# Patient Record
Sex: FEMALE | URBAN - METROPOLITAN AREA
[De-identification: names, ages, dates, MRNs, and addresses within clinical notes are randomized per-mention and may not be internally consistent; named-entity substitution may affect disease eponyms.]

---

## 2019-10-25 ENCOUNTER — APPOINTMENT (RX ONLY)
Dept: URBAN - METROPOLITAN AREA CLINIC 132 | Facility: CLINIC | Age: 44
Setting detail: DERMATOLOGY
End: 2019-10-25

## 2019-11-08 ENCOUNTER — RX ONLY (OUTPATIENT)
Age: 44
Setting detail: RX ONLY
End: 2019-11-08

## 2019-11-08 RX ORDER — METHYLPREDNISOLONE 4 MG/1
TABLET ORAL
Qty: 1 | Refills: 0 | Status: ERX | COMMUNITY
Start: 2019-11-08

## 2021-02-15 ENCOUNTER — RX ONLY (OUTPATIENT)
Age: 46
Setting detail: RX ONLY
End: 2021-02-15

## 2021-02-15 RX ORDER — CRISABOROLE 20 MG/G
OINTMENT TOPICAL
Qty: 1 | Refills: 2 | Status: ERX | COMMUNITY
Start: 2021-02-15

## 2021-05-25 ENCOUNTER — APPOINTMENT (RX ONLY)
Dept: URBAN - METROPOLITAN AREA CLINIC 90 | Facility: CLINIC | Age: 46
Setting detail: DERMATOLOGY
End: 2021-05-25

## 2021-05-25 DIAGNOSIS — L81.4 OTHER MELANIN HYPERPIGMENTATION: ICD-10-CM

## 2021-05-25 DIAGNOSIS — D22 MELANOCYTIC NEVI: ICD-10-CM

## 2021-05-25 DIAGNOSIS — L20.89 OTHER ATOPIC DERMATITIS: ICD-10-CM

## 2021-05-25 PROBLEM — D22.9 MELANOCYTIC NEVI, UNSPECIFIED: Status: ACTIVE | Noted: 2021-05-25

## 2021-05-25 PROBLEM — D48.5 NEOPLASM OF UNCERTAIN BEHAVIOR OF SKIN: Status: ACTIVE | Noted: 2021-05-25

## 2021-05-25 PROCEDURE — ? PRESCRIPTION

## 2021-05-25 PROCEDURE — 11102 TANGNTL BX SKIN SINGLE LES: CPT

## 2021-05-25 PROCEDURE — ? PRESCRIPTION MEDICATION MANAGEMENT

## 2021-05-25 PROCEDURE — ? TREATMENT REGIMEN

## 2021-05-25 PROCEDURE — ? FULL BODY SKIN EXAM

## 2021-05-25 PROCEDURE — ? BIOPSY BY SHAVE METHOD

## 2021-05-25 PROCEDURE — 99213 OFFICE O/P EST LOW 20 MIN: CPT | Mod: 25

## 2021-05-25 PROCEDURE — ? COUNSELING

## 2021-05-25 PROCEDURE — ? DIAGNOSIS COMMENT

## 2021-05-25 RX ORDER — TRIFAROTENE 50 UG/G
1 CREAM TOPICAL QD
Qty: 1 | Refills: 3 | Status: ERX | COMMUNITY
Start: 2021-05-25

## 2021-05-25 RX ORDER — CRISABOROLE 20 MG/G
1 OINTMENT TOPICAL BID
Qty: 1 | Refills: 6 | Status: ERX

## 2021-05-25 RX ADMIN — TRIFAROTENE 1: 50 CREAM TOPICAL at 00:00

## 2021-05-25 ASSESSMENT — LOCATION DETAILED DESCRIPTION DERM
LOCATION DETAILED: LEFT SUPERIOR LATERAL MALAR CHEEK
LOCATION DETAILED: LEFT VENTRAL PROXIMAL FOREARM
LOCATION DETAILED: UPPER STERNUM
LOCATION DETAILED: RIGHT ANTECUBITAL SKIN
LOCATION DETAILED: RIGHT SUPERIOR LATERAL MALAR CHEEK
LOCATION DETAILED: LEFT ANTECUBITAL SKIN
LOCATION DETAILED: LEFT POSTERIOR SHOULDER

## 2021-05-25 ASSESSMENT — LOCATION SIMPLE DESCRIPTION DERM
LOCATION SIMPLE: RIGHT CHEEK
LOCATION SIMPLE: CHEST
LOCATION SIMPLE: LEFT FOREARM
LOCATION SIMPLE: LEFT CHEEK
LOCATION SIMPLE: LEFT SHOULDER
LOCATION SIMPLE: RIGHT UPPER ARM
LOCATION SIMPLE: LEFT UPPER ARM

## 2021-05-25 ASSESSMENT — LOCATION ZONE DERM
LOCATION ZONE: ARM
LOCATION ZONE: ARM
LOCATION ZONE: TRUNK
LOCATION ZONE: FACE

## 2021-05-25 ASSESSMENT — SEVERITY ASSESSMENT 2020: SEVERITY 2020: CLEAR

## 2021-05-25 NOTE — PROCEDURE: BIOPSY BY SHAVE METHOD
Detail Level: Detailed
Depth Of Biopsy: dermis
Was A Bandage Applied: Yes
Size Of Lesion In Cm: 0.4
X Size Of Lesion In Cm: 0
Biopsy Type: H and E
Biopsy Method: Dermablade
Anesthesia Type: 1% lidocaine with epinephrine 1:100,000 buffered with 8.4% sodium bicarbonate (1:9 ratio)
Hemostasis: Aluminum Chloride
Wound Care: Mupirocin
Dressing: Band-Aid
Destruction After The Procedure: No
Type Of Destruction Used: Curettage
Curettage Text: The wound bed was treated with curettage after the biopsy was performed.
Cryotherapy Text: The wound bed was treated with cryotherapy after the biopsy was performed.
Electrodesiccation Text: The wound bed was treated with electrodesiccation after the biopsy was performed.
Electrodesiccation And Curettage Text: The wound bed was treated with electrodesiccation and curettage after the biopsy was performed.
Silver Nitrate Text: The wound bed was treated with silver nitrate after the biopsy was performed.
Lab: 6
Lab Facility: [id number]
Consent: Written consent was obtained and risks were reviewed including but not limited to scarring, infection, bleeding, scabbing, incomplete removal, nerve damage and allergy to anesthesia.
Post-Care Instructions: I reviewed with the patient in detail post-care instructions. Patient is to keep the biopsy site dry overnight, and then apply bactroban twice daily until healed. Patient may apply hydrogen peroxide soaks to remove any crusting.
Notification Instructions: Patient may RTC in 2 weeks if they would like to receive biopsy results in person.  Otherwise, patient is instructed to call the office if not contacted within 2  - 3 weeks.
Billing Type: Third-Party Bill
Information: Selecting Yes will display possible errors in your note based on the variables you have selected. This validation is only offered as a suggestion for you. PLEASE NOTE THAT THE VALIDATION TEXT WILL BE REMOVED WHEN YOU FINALIZE YOUR NOTE. IF YOU WANT TO FAX A PRELIMINARY NOTE YOU WILL NEED TO TOGGLE THIS TO 'NO' IF YOU DO NOT WANT IT IN YOUR FAXED NOTE.

## 2021-05-25 NOTE — PROCEDURE: DIAGNOSIS COMMENT
Render Risk Assessment In Note?: yes
Detail Level: Simple
Comment: Altagracia requests an RX for EUCRISA which controls her eczema.

## 2021-05-25 NOTE — PROCEDURE: PRESCRIPTION MEDICATION MANAGEMENT
Initiate Treatment: Aklief 0.005 % topical cream QHS
Render In Strict Bullet Format?: No
Detail Level: Zone
Samples Given: (3) samples of Aklief \\n(2) samples of Elta MD sunscreen (tinted and clear)
Continue Regimen: EUCRISA Ointment

## 2021-07-15 ENCOUNTER — RX ONLY (OUTPATIENT)
Age: 46
Setting detail: RX ONLY
End: 2021-07-15

## 2021-07-15 RX ORDER — METHYLPREDNISOLONE 4 MG/1
TABLET ORAL
Qty: 1 | Refills: 0 | Status: ERX | COMMUNITY
Start: 2021-07-14

## 2021-07-22 ENCOUNTER — APPOINTMENT (RX ONLY)
Dept: URBAN - METROPOLITAN AREA CLINIC 91 | Facility: CLINIC | Age: 46
Setting detail: DERMATOLOGY
End: 2021-07-22

## 2021-07-22 DIAGNOSIS — Z41.9 ENCOUNTER FOR PROCEDURE FOR PURPOSES OTHER THAN REMEDYING HEALTH STATE, UNSPECIFIED: ICD-10-CM

## 2021-07-22 PROCEDURE — ? BOTOX (U OR CC)

## 2021-07-22 ASSESSMENT — LOCATION DETAILED DESCRIPTION DERM
LOCATION DETAILED: RIGHT MEDIAL EYEBROW
LOCATION DETAILED: LEFT FOREHEAD
LOCATION DETAILED: GLABELLA
LOCATION DETAILED: LEFT MEDIAL EYEBROW
LOCATION DETAILED: GLABELLA
LOCATION DETAILED: RIGHT FOREHEAD

## 2021-07-22 ASSESSMENT — LOCATION SIMPLE DESCRIPTION DERM
LOCATION SIMPLE: GLABELLA
LOCATION SIMPLE: LEFT FOREHEAD
LOCATION SIMPLE: RIGHT FOREHEAD
LOCATION SIMPLE: RIGHT EYEBROW
LOCATION SIMPLE: LEFT EYEBROW
LOCATION SIMPLE: GLABELLA

## 2021-07-22 ASSESSMENT — LOCATION ZONE DERM
LOCATION ZONE: FACE
LOCATION ZONE: FACE

## 2021-07-22 NOTE — PROCEDURE: BOTOX (U OR CC)
Post-Care Instructions: Botox Cosmetic Post Treatment Instructions\\n1. Try to exercise your treated muscles for 1-2 hours after treatment (e.g. practice frowning, raising your eyebrows or squinting). This helps to work BOTOX Cosmetic into your muscles. Although this is thought to help, it will NOT impact your treatment negatively if you forget to do this.\\n2. Do NOT rub or massage the treated areas for 2 hours after your treatment. Do NOT do strenuous exercise for 4 hours after treatment. Also avoid facials or saunas for 4 hours after your treatment. This will minimize the risk of raising your blood pressure and therefore minimize the risk of temporary bruising. Feel free to shower and go about most other regular daily activities.\\n3. Do NOT lie down for 4 hours after treatment. This is to avoid the risk of pressure on the treated areas (from your pillow) and to avoid the risk of having the area rubbed accidentally.\\n4. Be assured that any tiny bumps or marks will go away within a few hours. If you need to apply make-up within 4 hours after treatment, only use a GENTLE touch to avoid rubbing the treated area.\\n5. Results of your treatment may take up to 14 days to take full effect. Please wait until the 14 days has passed before assessing if you are pleased with the result.\\n6. PHIL Coelho needs to see you for a 2 week follow up assessment appointment. This will ensure PHIL Coelho is able to see how YOUR facial muscles reacted to your treatment. If you require more\\nproduct to fine tune/adjust your treatment results, it will be applied during this appointment at an additional cost. For medical reasons, your results will be photographed and documented in your confidential patient file.\\nYour 2 week assessment is scheduled for _______________________________________________.\\n7. Because BOTOX Cosmetic requires a special technique in order to customize the injections to your individual muscular structure, it is important that your muscle actively recovers BUT that your skin is not creasing to the point from where you started.\\n8. BOTOX Cosmetic is a temporary procedure and at first, you may find that your treatment results will last approximately 3-4 months. If you maintain your treatment appointments with the frequency recommended by PHIL Coelho, the duration of each treatment result may last longer than 4 months.\\n9. Initially PA Laquita sees her patients between the 3 months (12 week) and 4 months (16 week) time period. She is able to create the best clinical results for you during this period. If you allow BOTOX Cosmetics to completely wear off, it is difficult for PHIL Coelho to be able to see how your individual muscles reacted and therefore optimal results for YOUR face can be more difficult to achieve.\\n10. PHIL Coelho will need to see you again in 3-4 month. Please ensure you schedule this appointment before you leave our office today.\\nYour next appointment is scheduled for _______________________________________________.\\nIf you have any concerns please call our office at: \\[facility]               [phone]\\Kamron Castaneda       [phone] Post-Care Instructions: Botox Cosmetic Post Treatment Instructions\\n1. Try to exercise your treated muscles for 1-2 hours after treatment (e.g. practice frowning, raising your eyebrows or squinting). This helps to work BOTOX Cosmetic into your muscles. Although this is thought to help, it will NOT impact your treatment negatively if you forget to do this.\\n2. Do NOT rub or massage the treated areas for 2 hours after your treatment. Do NOT do strenuous exercise for 4 hours after treatment. Also avoid facials or saunas for 4 hours after your treatment. This will minimize the risk of raising your blood pressure and therefore minimize the risk of temporary bruising. Feel free to shower and go about most other regular daily activities.\\n3. Do NOT lie down for 4 hours after treatment. This is to avoid the risk of pressure on the treated areas (from your pillow) and to avoid the risk of having the area rubbed accidentally.\\n4. Be assured that any tiny bumps or marks will go away within a few hours. If you need to apply make-up within 4 hours after treatment, only use a GENTLE touch to avoid rubbing the treated area.\\n5. Results of your treatment may take up to 14 days to take full effect. Please wait until the 14 days has passed before assessing if you are pleased with the result.\\n6. PHIL Coelho needs to see you for a 2 week follow up assessment appointment. This will ensure PHIL Coelho is able to see how YOUR facial muscles reacted to your treatment. If you require more\\nproduct to fine tune/adjust your treatment results, it will be applied during this appointment at an additional cost. For medical reasons, your results will be photographed and documented in your confidential patient file.\\nYour 2 week assessment is scheduled for _______________________________________________.\\n7. Because BOTOX Cosmetic requires a special technique in order to customize the injections to your individual muscular structure, it is important that your muscle actively recovers BUT that your skin is not creasing to the point from where you started.\\n8. BOTOX Cosmetic is a temporary procedure and at first, you may find that your treatment results will last approximately 3-4 months. If you maintain your treatment appointments with the frequency recommended by PHIL Coelho, the duration of each treatment result may last longer than 4 months.\\n9. Initially PA Laquita sees her patients between the 3 months (12 week) and 4 months (16 week) time period. She is able to create the best clinical results for you during this period. If you allow BOTOX Cosmetics to completely wear off, it is difficult for PHIL Coelho to be able to see how your individual muscles reacted and therefore optimal results for YOUR face can be more difficult to achieve.\\n10. PHIL Coelho will need to see you again in 3-4 month. Please ensure you schedule this appointment before you leave our office today.\\nYour next appointment is scheduled for _______________________________________________.\\nIf you have any concerns please call our office at: \\[facility]               [phone]\\Kamron Castaneda       [phone]

## 2021-10-08 ENCOUNTER — APPOINTMENT (RX ONLY)
Dept: URBAN - METROPOLITAN AREA CLINIC 90 | Facility: CLINIC | Age: 46
Setting detail: DERMATOLOGY
End: 2021-10-08

## 2021-10-08 DIAGNOSIS — Z41.9 ENCOUNTER FOR PROCEDURE FOR PURPOSES OTHER THAN REMEDYING HEALTH STATE, UNSPECIFIED: ICD-10-CM

## 2021-10-08 PROCEDURE — ? BOTOX (U OR CC)

## 2021-10-08 ASSESSMENT — LOCATION SIMPLE DESCRIPTION DERM
LOCATION SIMPLE: RIGHT EYEBROW
LOCATION SIMPLE: GLABELLA
LOCATION SIMPLE: LEFT CHEEK
LOCATION SIMPLE: RIGHT CHEEK
LOCATION SIMPLE: LEFT EYELID
LOCATION SIMPLE: LEFT EYEBROW
LOCATION SIMPLE: LEFT FOREHEAD
LOCATION SIMPLE: RIGHT FOREHEAD
LOCATION SIMPLE: RIGHT TEMPLE

## 2021-10-08 ASSESSMENT — LOCATION DETAILED DESCRIPTION DERM
LOCATION DETAILED: LEFT LATERAL EYEBROW
LOCATION DETAILED: LEFT LATERAL CANTHUS
LOCATION DETAILED: GLABELLA
LOCATION DETAILED: LEFT INFERIOR FOREHEAD
LOCATION DETAILED: RIGHT FOREHEAD
LOCATION DETAILED: RIGHT INFERIOR TEMPLE
LOCATION DETAILED: LEFT SUPERIOR CENTRAL MALAR CHEEK
LOCATION DETAILED: RIGHT SUPERIOR CENTRAL MALAR CHEEK
LOCATION DETAILED: LEFT FOREHEAD
LOCATION DETAILED: RIGHT LATERAL EYEBROW

## 2021-10-08 ASSESSMENT — LOCATION ZONE DERM
LOCATION ZONE: FACE
LOCATION ZONE: FACE
LOCATION ZONE: EYELID

## 2021-10-08 NOTE — PROCEDURE: BOTOX (U OR CC)
Post-Care Instructions: Botox Cosmetic Post Treatment Instructions\\n1. Try to exercise your treated muscles for 1-2 hours after treatment (e.g. practice frowning, raising your eyebrows or squinting). This helps to work BOTOX Cosmetic into your muscles. Although this is thought to help, it will NOT impact your treatment negatively if you forget to do this.\\n2. Do NOT rub or massage the treated areas for 2 hours after your treatment. Do NOT do strenuous exercise for 4 hours after treatment. Also avoid facials or saunas for 4 hours after your treatment. This will minimize the risk of raising your blood pressure and therefore minimize the risk of temporary bruising. Feel free to shower and go about most other regular daily activities.\\n3. Do NOT lie down for 4 hours after treatment. This is to avoid the risk of pressure on the treated areas (from your pillow) and to avoid the risk of having the area rubbed accidentally.\\n4. Be assured that any tiny bumps or marks will go away within a few hours. If you need to apply make-up within 4 hours after treatment, only use a GENTLE touch to avoid rubbing the treated area.\\n5. Results of your treatment may take up to 14 days to take full effect. Please wait until the 14 days has passed before assessing if you are pleased with the result.\\n6. PHIL Coelho needs to see you for a 2 week follow up assessment appointment. This will ensure PHIL Coelho is able to see how YOUR facial muscles reacted to your treatment. If you require more\\nproduct to fine tune/adjust your treatment results, it will be applied during this appointment at an additional cost. For medical reasons, your results will be photographed and documented in your confidential patient file.\\nYour 2 week assessment is scheduled for _______________________________________________.\\n7. Because BOTOX Cosmetic requires a special technique in order to customize the injections to your individual muscular structure, it is important that your muscle actively recovers BUT that your skin is not creasing to the point from where you started.\\n8. BOTOX Cosmetic is a temporary procedure and at first, you may find that your treatment results will last approximately 3-4 months. If you maintain your treatment appointments with the frequency recommended by PHIL Coelho, the duration of each treatment result may last longer than 4 months.\\n9. Initially PA Laquita sees her patients between the 3 months (12 week) and 4 months (16 week) time period. She is able to create the best clinical results for you during this period. If you allow BOTOX Cosmetics to completely wear off, it is difficult for PHIL Coelho to be able to see how your individual muscles reacted and therefore optimal results for YOUR face can be more difficult to achieve.\\n10. PHIL Coelho will need to see you again in 3-4 month. Please ensure you schedule this appointment before you leave our office today.\\nYour next appointment is scheduled for _______________________________________________.\\nIf you have any concerns please call our office at: \\[facility]               [phone]\\Kamron Castaneda       [phone] Post-Care Instructions: Botox Cosmetic Post Treatment Instructions\\n1. Try to exercise your treated muscles for 1-2 hours after treatment (e.g. practice frowning, raising your eyebrows or squinting). This helps to work BOTOX Cosmetic into your muscles. Although this is thought to help, it will NOT impact your treatment negatively if you forget to do this.\\n2. Do NOT rub or massage the treated areas for 2 hours after your treatment. Do NOT do strenuous exercise for 4 hours after treatment. Also avoid facials or saunas for 4 hours after your treatment. This will minimize the risk of raising your blood pressure and therefore minimize the risk of temporary bruising. Feel free to shower and go about most other regular daily activities.\\n3. Do NOT lie down for 4 hours after treatment. This is to avoid the risk of pressure on the treated areas (from your pillow) and to avoid the risk of having the area rubbed accidentally.\\n4. Be assured that any tiny bumps or marks will go away within a few hours. If you need to apply make-up within 4 hours after treatment, only use a GENTLE touch to avoid rubbing the treated area.\\n5. Results of your treatment may take up to 14 days to take full effect. Please wait until the 14 days has passed before assessing if you are pleased with the result.\\n6. PHIL Coelho needs to see you for a 2 week follow up assessment appointment. This will ensure PHIL Coelho is able to see how YOUR facial muscles reacted to your treatment. If you require more\\nproduct to fine tune/adjust your treatment results, it will be applied during this appointment at an additional cost. For medical reasons, your results will be photographed and documented in your confidential patient file.\\nYour 2 week assessment is scheduled for _______________________________________________.\\n7. Because BOTOX Cosmetic requires a special technique in order to customize the injections to your individual muscular structure, it is important that your muscle actively recovers BUT that your skin is not creasing to the point from where you started.\\n8. BOTOX Cosmetic is a temporary procedure and at first, you may find that your treatment results will last approximately 3-4 months. If you maintain your treatment appointments with the frequency recommended by PHIL Coelho, the duration of each treatment result may last longer than 4 months.\\n9. Initially PA Laquita sees her patients between the 3 months (12 week) and 4 months (16 week) time period. She is able to create the best clinical results for you during this period. If you allow BOTOX Cosmetics to completely wear off, it is difficult for PHIL Coelho to be able to see how your individual muscles reacted and therefore optimal results for YOUR face can be more difficult to achieve.\\n10. PHIL Coelho will need to see you again in 3-4 month. Please ensure you schedule this appointment before you leave our office today.\\nYour next appointment is scheduled for _______________________________________________.\\nIf you have any concerns please call our office at: \\[facility]               [phone]\\Kamron Castaneda       [phone]

## 2022-03-07 ENCOUNTER — APPOINTMENT (RX ONLY)
Dept: URBAN - METROPOLITAN AREA CLINIC 90 | Facility: CLINIC | Age: 47
Setting detail: DERMATOLOGY
End: 2022-03-07

## 2022-03-07 DIAGNOSIS — Z41.9 ENCOUNTER FOR PROCEDURE FOR PURPOSES OTHER THAN REMEDYING HEALTH STATE, UNSPECIFIED: ICD-10-CM

## 2022-03-07 PROCEDURE — ? BOTOX (U OR CC)

## 2022-03-07 ASSESSMENT — LOCATION DETAILED DESCRIPTION DERM
LOCATION DETAILED: RIGHT FOREHEAD
LOCATION DETAILED: LEFT LATERAL CANTHUS
LOCATION DETAILED: RIGHT LATERAL EYEBROW
LOCATION DETAILED: RIGHT INFERIOR TEMPLE
LOCATION DETAILED: GLABELLA
LOCATION DETAILED: LEFT FOREHEAD
LOCATION DETAILED: LEFT SUPERIOR CENTRAL MALAR CHEEK
LOCATION DETAILED: LEFT LATERAL EYEBROW
LOCATION DETAILED: RIGHT SUPERIOR CENTRAL MALAR CHEEK
LOCATION DETAILED: LEFT INFERIOR FOREHEAD

## 2022-03-07 ASSESSMENT — LOCATION SIMPLE DESCRIPTION DERM
LOCATION SIMPLE: RIGHT CHEEK
LOCATION SIMPLE: GLABELLA
LOCATION SIMPLE: LEFT EYEBROW
LOCATION SIMPLE: RIGHT EYEBROW
LOCATION SIMPLE: RIGHT TEMPLE
LOCATION SIMPLE: LEFT CHEEK
LOCATION SIMPLE: LEFT EYELID
LOCATION SIMPLE: LEFT FOREHEAD
LOCATION SIMPLE: RIGHT FOREHEAD

## 2022-03-07 ASSESSMENT — LOCATION ZONE DERM
LOCATION ZONE: FACE
LOCATION ZONE: EYELID
LOCATION ZONE: FACE

## 2022-03-07 NOTE — PROCEDURE: BOTOX (U OR CC)
Post-Care Instructions: Botox Cosmetic Post Treatment Instructions\\n1. Try to exercise your treated muscles for 1-2 hours after treatment (e.g. practice frowning, raising your eyebrows or squinting). This helps to work BOTOX Cosmetic into your muscles. Although this is thought to help, it will NOT impact your treatment negatively if you forget to do this.\\n2. Do NOT rub or massage the treated areas for 2 hours after your treatment. Do NOT do strenuous exercise for 4 hours after treatment. Also avoid facials or saunas for 4 hours after your treatment. This will minimize the risk of raising your blood pressure and therefore minimize the risk of temporary bruising. Feel free to shower and go about most other regular daily activities.\\n3. Do NOT lie down for 4 hours after treatment. This is to avoid the risk of pressure on the treated areas (from your pillow) and to avoid the risk of having the area rubbed accidentally.\\n4. Be assured that any tiny bumps or marks will go away within a few hours. If you need to apply make-up within 4 hours after treatment, only use a GENTLE touch to avoid rubbing the treated area.\\n5. Results of your treatment may take up to 14 days to take full effect. Please wait until the 14 days has passed before assessing if you are pleased with the result.\\n6. PHIL Coelho needs to see you for a 2 week follow up assessment appointment. This will ensure PHIL Coelho is able to see how YOUR facial muscles reacted to your treatment. If you require more\\nproduct to fine tune/adjust your treatment results, it will be applied during this appointment at an additional cost. For medical reasons, your results will be photographed and documented in your confidential patient file.\\nYour 2 week assessment is scheduled for _______________________________________________.\\n7. Because BOTOX Cosmetic requires a special technique in order to customize the injections to your individual muscular structure, it is important that your muscle actively recovers BUT that your skin is not creasing to the point from where you started.\\n8. BOTOX Cosmetic is a temporary procedure and at first, you may find that your treatment results will last approximately 3-4 months. If you maintain your treatment appointments with the frequency recommended by PHIL Coelho, the duration of each treatment result may last longer than 4 months.\\n9. Initially PA Laquita sees her patients between the 3 months (12 week) and 4 months (16 week) time period. She is able to create the best clinical results for you during this period. If you allow BOTOX Cosmetics to completely wear off, it is difficult for PHIL Coelho to be able to see how your individual muscles reacted and therefore optimal results for YOUR face can be more difficult to achieve.\\n10. PHIL Coelho will need to see you again in 3-4 month. Please ensure you schedule this appointment before you leave our office today.\\nYour next appointment is scheduled for _______________________________________________.\\nIf you have any concerns please call our office at: \\[facility]               [phone]\\Kamron Castaneda       [phone] Post-Care Instructions: Botox Cosmetic Post Treatment Instructions\\n1. Try to exercise your treated muscles for 1-2 hours after treatment (e.g. practice frowning, raising your eyebrows or squinting). This helps to work BOTOX Cosmetic into your muscles. Although this is thought to help, it will NOT impact your treatment negatively if you forget to do this.\\n2. Do NOT rub or massage the treated areas for 2 hours after your treatment. Do NOT do strenuous exercise for 4 hours after treatment. Also avoid facials or saunas for 4 hours after your treatment. This will minimize the risk of raising your blood pressure and therefore minimize the risk of temporary bruising. Feel free to shower and go about most other regular daily activities.\\n3. Do NOT lie down for 4 hours after treatment. This is to avoid the risk of pressure on the treated areas (from your pillow) and to avoid the risk of having the area rubbed accidentally.\\n4. Be assured that any tiny bumps or marks will go away within a few hours. If you need to apply make-up within 4 hours after treatment, only use a GENTLE touch to avoid rubbing the treated area.\\n5. Results of your treatment may take up to 14 days to take full effect. Please wait until the 14 days has passed before assessing if you are pleased with the result.\\n6. PHIL Coelho needs to see you for a 2 week follow up assessment appointment. This will ensure PHIL Coelho is able to see how YOUR facial muscles reacted to your treatment. If you require more\\nproduct to fine tune/adjust your treatment results, it will be applied during this appointment at an additional cost. For medical reasons, your results will be photographed and documented in your confidential patient file.\\nYour 2 week assessment is scheduled for _______________________________________________.\\n7. Because BOTOX Cosmetic requires a special technique in order to customize the injections to your individual muscular structure, it is important that your muscle actively recovers BUT that your skin is not creasing to the point from where you started.\\n8. BOTOX Cosmetic is a temporary procedure and at first, you may find that your treatment results will last approximately 3-4 months. If you maintain your treatment appointments with the frequency recommended by PHIL Coelho, the duration of each treatment result may last longer than 4 months.\\n9. Initially PA Laquita sees her patients between the 3 months (12 week) and 4 months (16 week) time period. She is able to create the best clinical results for you during this period. If you allow BOTOX Cosmetics to completely wear off, it is difficult for PHIL Coelho to be able to see how your individual muscles reacted and therefore optimal results for YOUR face can be more difficult to achieve.\\n10. PHIL Coelho will need to see you again in 3-4 month. Please ensure you schedule this appointment before you leave our office today.\\nYour next appointment is scheduled for _______________________________________________.\\nIf you have any concerns please call our office at: \\[facility]               [phone]\\Kamron Castaneda       [phone]

## 2022-05-31 ENCOUNTER — APPOINTMENT (RX ONLY)
Dept: URBAN - METROPOLITAN AREA CLINIC 91 | Facility: CLINIC | Age: 47
Setting detail: DERMATOLOGY
End: 2022-05-31

## 2022-05-31 DIAGNOSIS — Z41.9 ENCOUNTER FOR PROCEDURE FOR PURPOSES OTHER THAN REMEDYING HEALTH STATE, UNSPECIFIED: ICD-10-CM

## 2022-05-31 PROCEDURE — ? BOTOX (U OR CC)

## 2022-05-31 ASSESSMENT — LOCATION SIMPLE DESCRIPTION DERM
LOCATION SIMPLE: LEFT EYEBROW
LOCATION SIMPLE: RIGHT TEMPLE
LOCATION SIMPLE: LEFT NOSE
LOCATION SIMPLE: LEFT LIP
LOCATION SIMPLE: RIGHT LIP
LOCATION SIMPLE: LEFT EYELID
LOCATION SIMPLE: RIGHT FOREHEAD
LOCATION SIMPLE: LEFT CHEEK
LOCATION SIMPLE: NOSE
LOCATION SIMPLE: RIGHT EYEBROW
LOCATION SIMPLE: GLABELLA
LOCATION SIMPLE: RIGHT CHEEK
LOCATION SIMPLE: LEFT FOREHEAD
LOCATION SIMPLE: GLABELLA

## 2022-05-31 ASSESSMENT — LOCATION DETAILED DESCRIPTION DERM
LOCATION DETAILED: RIGHT LATERAL EYEBROW
LOCATION DETAILED: LEFT FOREHEAD
LOCATION DETAILED: RIGHT SUPERIOR CENTRAL MALAR CHEEK
LOCATION DETAILED: LEFT INFERIOR FOREHEAD
LOCATION DETAILED: LEFT LATERAL CANTHUS
LOCATION DETAILED: GLABELLA
LOCATION DETAILED: NASAL ROOT
LOCATION DETAILED: LEFT LATERAL EYEBROW
LOCATION DETAILED: LEFT UPPER CUTANEOUS LIP
LOCATION DETAILED: RIGHT INFERIOR TEMPLE
LOCATION DETAILED: LEFT SUPERIOR CENTRAL MALAR CHEEK
LOCATION DETAILED: LEFT NASAL SIDEWALL
LOCATION DETAILED: RIGHT UPPER CUTANEOUS LIP
LOCATION DETAILED: RIGHT FOREHEAD
LOCATION DETAILED: GLABELLA

## 2022-05-31 ASSESSMENT — LOCATION ZONE DERM
LOCATION ZONE: LIP
LOCATION ZONE: FACE
LOCATION ZONE: NOSE
LOCATION ZONE: EYELID
LOCATION ZONE: FACE

## 2022-05-31 NOTE — PROCEDURE: BOTOX (U OR CC)
Post-Care Instructions: Botox Cosmetic Post Treatment Instructions\\n1. Try to exercise your treated muscles for 1-2 hours after treatment (e.g. practice frowning, raising your eyebrows or squinting). This helps to work BOTOX Cosmetic into your muscles. Although this is thought to help, it will NOT impact your treatment negatively if you forget to do this.\\n2. Do NOT rub or massage the treated areas for 2 hours after your treatment. Do NOT do strenuous exercise for 4 hours after treatment. Also avoid facials or saunas for 4 hours after your treatment. This will minimize the risk of raising your blood pressure and therefore minimize the risk of temporary bruising. Feel free to shower and go about most other regular daily activities.\\n3. Do NOT lie down for 4 hours after treatment. This is to avoid the risk of pressure on the treated areas (from your pillow) and to avoid the risk of having the area rubbed accidentally.\\n4. Be assured that any tiny bumps or marks will go away within a few hours. If you need to apply make-up within 4 hours after treatment, only use a GENTLE touch to avoid rubbing the treated area.\\n5. Results of your treatment may take up to 14 days to take full effect. Please wait until the 14 days has passed before assessing if you are pleased with the result.\\n6. PHIL Coelho needs to see you for a 2 week follow up assessment appointment. This will ensure PHIL Coelho is able to see how YOUR facial muscles reacted to your treatment. If you require more\\nproduct to fine tune/adjust your treatment results, it will be applied during this appointment at an additional cost. For medical reasons, your results will be photographed and documented in your confidential patient file.\\nYour 2 week assessment is scheduled for _______________________________________________.\\n7. Because BOTOX Cosmetic requires a special technique in order to customize the injections to your individual muscular structure, it is important that your muscle actively recovers BUT that your skin is not creasing to the point from where you started.\\n8. BOTOX Cosmetic is a temporary procedure and at first, you may find that your treatment results will last approximately 3-4 months. If you maintain your treatment appointments with the frequency recommended by PHIL Coelho, the duration of each treatment result may last longer than 4 months.\\n9. Initially PA Laquita sees her patients between the 3 months (12 week) and 4 months (16 week) time period. She is able to create the best clinical results for you during this period. If you allow BOTOX Cosmetics to completely wear off, it is difficult for PHIL Coelho to be able to see how your individual muscles reacted and therefore optimal results for YOUR face can be more difficult to achieve.\\n10. PHIL Coelho will need to see you again in 3-4 month. Please ensure you schedule this appointment before you leave our office today.\\nYour next appointment is scheduled for _______________________________________________.\\nIf you have any concerns please call our office at: \\[facility]               [phone]\\Kamron Castaneda       [phone] Post-Care Instructions: Botox Cosmetic Post Treatment Instructions\\n1. Try to exercise your treated muscles for 1-2 hours after treatment (e.g. practice frowning, raising your eyebrows or squinting). This helps to work BOTOX Cosmetic into your muscles. Although this is thought to help, it will NOT impact your treatment negatively if you forget to do this.\\n2. Do NOT rub or massage the treated areas for 2 hours after your treatment. Do NOT do strenuous exercise for 4 hours after treatment. Also avoid facials or saunas for 4 hours after your treatment. This will minimize the risk of raising your blood pressure and therefore minimize the risk of temporary bruising. Feel free to shower and go about most other regular daily activities.\\n3. Do NOT lie down for 4 hours after treatment. This is to avoid the risk of pressure on the treated areas (from your pillow) and to avoid the risk of having the area rubbed accidentally.\\n4. Be assured that any tiny bumps or marks will go away within a few hours. If you need to apply make-up within 4 hours after treatment, only use a GENTLE touch to avoid rubbing the treated area.\\n5. Results of your treatment may take up to 14 days to take full effect. Please wait until the 14 days has passed before assessing if you are pleased with the result.\\n6. PHIL Coelho needs to see you for a 2 week follow up assessment appointment. This will ensure PHIL Coelho is able to see how YOUR facial muscles reacted to your treatment. If you require more\\nproduct to fine tune/adjust your treatment results, it will be applied during this appointment at an additional cost. For medical reasons, your results will be photographed and documented in your confidential patient file.\\nYour 2 week assessment is scheduled for _______________________________________________.\\n7. Because BOTOX Cosmetic requires a special technique in order to customize the injections to your individual muscular structure, it is important that your muscle actively recovers BUT that your skin is not creasing to the point from where you started.\\n8. BOTOX Cosmetic is a temporary procedure and at first, you may find that your treatment results will last approximately 3-4 months. If you maintain your treatment appointments with the frequency recommended by PHIL Coelho, the duration of each treatment result may last longer than 4 months.\\n9. Initially PA Laquita sees her patients between the 3 months (12 week) and 4 months (16 week) time period. She is able to create the best clinical results for you during this period. If you allow BOTOX Cosmetics to completely wear off, it is difficult for PHIL Coelho to be able to see how your individual muscles reacted and therefore optimal results for YOUR face can be more difficult to achieve.\\n10. PHIL Coelho will need to see you again in 3-4 month. Please ensure you schedule this appointment before you leave our office today.\\nYour next appointment is scheduled for _______________________________________________.\\nIf you have any concerns please call our office at: \\[facility]annie               [phone]\\Kamron Castaneda       [phone]

## 2022-11-21 ENCOUNTER — APPOINTMENT (RX ONLY)
Dept: URBAN - METROPOLITAN AREA CLINIC 91 | Facility: CLINIC | Age: 47
Setting detail: DERMATOLOGY
End: 2022-11-21

## 2022-11-21 ENCOUNTER — RX ONLY (OUTPATIENT)
Age: 47
Setting detail: RX ONLY
End: 2022-11-21

## 2022-11-21 DIAGNOSIS — Z41.9 ENCOUNTER FOR PROCEDURE FOR PURPOSES OTHER THAN REMEDYING HEALTH STATE, UNSPECIFIED: ICD-10-CM

## 2022-11-21 DIAGNOSIS — L20.89 OTHER ATOPIC DERMATITIS: ICD-10-CM | Status: INADEQUATELY CONTROLLED

## 2022-11-21 PROCEDURE — ? PRESCRIPTION MEDICATION MANAGEMENT

## 2022-11-21 PROCEDURE — 99214 OFFICE O/P EST MOD 30 MIN: CPT

## 2022-11-21 PROCEDURE — ? COUNSELING

## 2022-11-21 PROCEDURE — ? BOTOX (U OR CC)

## 2022-11-21 RX ORDER — DESOXIMETASONE 2.5 MG/ML
1 SPRAY TOPICAL BID
Qty: 100 | Refills: 2 | Status: ERX | COMMUNITY
Start: 2022-11-21

## 2022-11-21 ASSESSMENT — LOCATION DETAILED DESCRIPTION DERM
LOCATION DETAILED: RIGHT RADIAL DORSAL HAND
LOCATION DETAILED: LEFT FOREHEAD
LOCATION DETAILED: RIGHT INFERIOR TEMPLE
LOCATION DETAILED: LEFT SUPERIOR CENTRAL MALAR CHEEK
LOCATION DETAILED: LEFT INFERIOR FOREHEAD
LOCATION DETAILED: RIGHT VENTRAL DISTAL FOREARM
LOCATION DETAILED: LEFT NASAL SIDEWALL
LOCATION DETAILED: RIGHT SUPERIOR CENTRAL MALAR CHEEK
LOCATION DETAILED: RIGHT INFERIOR ANTERIOR NECK
LOCATION DETAILED: LEFT UPPER CUTANEOUS LIP
LOCATION DETAILED: RIGHT PROXIMAL PRETIBIAL REGION
LOCATION DETAILED: LEFT ANKLE
LOCATION DETAILED: LEFT DISTAL PRETIBIAL REGION
LOCATION DETAILED: RIGHT DISTAL PRETIBIAL REGION
LOCATION DETAILED: LEFT MEDIAL EYEBROW
LOCATION DETAILED: RIGHT ANKLE
LOCATION DETAILED: LEFT PROXIMAL PRETIBIAL REGION
LOCATION DETAILED: RIGHT FOREHEAD
LOCATION DETAILED: LEFT RADIAL DORSAL HAND
LOCATION DETAILED: RIGHT UPPER CUTANEOUS LIP
LOCATION DETAILED: NASAL ROOT
LOCATION DETAILED: LEFT LATERAL EYEBROW
LOCATION DETAILED: LEFT VENTRAL DISTAL FOREARM
LOCATION DETAILED: RIGHT LATERAL EYEBROW
LOCATION DETAILED: RIGHT ANTECUBITAL SKIN
LOCATION DETAILED: LEFT VENTRAL PROXIMAL FOREARM
LOCATION DETAILED: LEFT LATERAL CANTHUS
LOCATION DETAILED: GLABELLA
LOCATION DETAILED: GLABELLA
LOCATION DETAILED: RIGHT CENTRAL EYEBROW
LOCATION DETAILED: LEFT ANTECUBITAL SKIN

## 2022-11-21 ASSESSMENT — LOCATION SIMPLE DESCRIPTION DERM
LOCATION SIMPLE: LEFT EYEBROW
LOCATION SIMPLE: LEFT LIP
LOCATION SIMPLE: GLABELLA
LOCATION SIMPLE: LEFT CHEEK
LOCATION SIMPLE: LEFT EYELID
LOCATION SIMPLE: RIGHT PRETIBIAL REGION
LOCATION SIMPLE: LEFT EYEBROW
LOCATION SIMPLE: NOSE
LOCATION SIMPLE: RIGHT CHEEK
LOCATION SIMPLE: RIGHT LIP
LOCATION SIMPLE: RIGHT ANKLE
LOCATION SIMPLE: LEFT FOREARM
LOCATION SIMPLE: LEFT HAND
LOCATION SIMPLE: LEFT UPPER ARM
LOCATION SIMPLE: GLABELLA
LOCATION SIMPLE: RIGHT HAND
LOCATION SIMPLE: LEFT PRETIBIAL REGION
LOCATION SIMPLE: RIGHT FOREARM
LOCATION SIMPLE: LEFT FOREHEAD
LOCATION SIMPLE: RIGHT TEMPLE
LOCATION SIMPLE: LEFT NOSE
LOCATION SIMPLE: RIGHT EYEBROW
LOCATION SIMPLE: RIGHT ANTERIOR NECK
LOCATION SIMPLE: LEFT FOREARM
LOCATION SIMPLE: RIGHT FOREHEAD
LOCATION SIMPLE: LEFT ANKLE
LOCATION SIMPLE: RIGHT EYEBROW
LOCATION SIMPLE: RIGHT UPPER ARM

## 2022-11-21 ASSESSMENT — LOCATION ZONE DERM
LOCATION ZONE: FACE
LOCATION ZONE: LIP
LOCATION ZONE: ARM
LOCATION ZONE: EYELID
LOCATION ZONE: LEG
LOCATION ZONE: HAND
LOCATION ZONE: FACE
LOCATION ZONE: NECK
LOCATION ZONE: FACE
LOCATION ZONE: ARM
LOCATION ZONE: NOSE

## 2022-11-21 ASSESSMENT — SEVERITY ASSESSMENT 2020: SEVERITY 2020: MODERATE

## 2022-11-21 ASSESSMENT — ITCH NUMERIC RATING SCALE: HOW SEVERE IS YOUR ITCHING?: 8

## 2022-11-21 ASSESSMENT — BSA ECZEMA: % BODY COVERED IN ECZEMA: 5

## 2022-11-21 NOTE — PROCEDURE: PRESCRIPTION MEDICATION MANAGEMENT
Initiate Treatment: SUBMIT Dupixent to [facility]
Discontinue Regimen: Eucrisa - ineffective\\nElidel- ineffective\\nTriamicinolone - ineffective
Detail Level: Zone
Continue Regimen: Hypoallergenics (soap & emollients)
Render In Strict Bullet Format?: No

## 2022-11-21 NOTE — PROCEDURE: BOTOX (U OR CC)
Post-Care Instructions: Botox Cosmetic Post Treatment Instructions\\n1. Try to exercise your treated muscles for 1-2 hours after treatment (e.g. practice frowning, raising your eyebrows or squinting). This helps to work BOTOX Cosmetic into your muscles. Although this is thought to help, it will NOT impact your treatment negatively if you forget to do this.\\n2. Do NOT rub or massage the treated areas for 2 hours after your treatment. Do NOT do strenuous exercise for 4 hours after treatment. Also avoid facials or saunas for 4 hours after your treatment. This will minimize the risk of raising your blood pressure and therefore minimize the risk of temporary bruising. Feel free to shower and go about most other regular daily activities.\\n3. Do NOT lie down for 4 hours after treatment. This is to avoid the risk of pressure on the treated areas (from your pillow) and to avoid the risk of having the area rubbed accidentally.\\n4. Be assured that any tiny bumps or marks will go away within a few hours. If you need to apply make-up within 4 hours after treatment, only use a GENTLE touch to avoid rubbing the treated area.\\n5. Results of your treatment may take up to 14 days to take full effect. Please wait until the 14 days has passed before assessing if you are pleased with the result.\\n6. PHIL Coelho needs to see you for a 2 week follow up assessment appointment. This will ensure PHIL Coelho is able to see how YOUR facial muscles reacted to your treatment. If you require more\\nproduct to fine tune/adjust your treatment results, it will be applied during this appointment at an additional cost. For medical reasons, your results will be photographed and documented in your confidential patient file.\\nYour 2 week assessment is scheduled for _______________________________________________.\\n7. Because BOTOX Cosmetic requires a special technique in order to customize the injections to your individual muscular structure, it is important that your muscle actively recovers BUT that your skin is not creasing to the point from where you started.\\n8. BOTOX Cosmetic is a temporary procedure and at first, you may find that your treatment results will last approximately 3-4 months. If you maintain your treatment appointments with the frequency recommended by PHIL Coelho, the duration of each treatment result may last longer than 4 months.\\n9. Initially PA Laquita sees her patients between the 3 months (12 week) and 4 months (16 week) time period. She is able to create the best clinical results for you during this period. If you allow BOTOX Cosmetics to completely wear off, it is difficult for PHIL Coelho to be able to see how your individual muscles reacted and therefore optimal results for YOUR face can be more difficult to achieve.\\n10. PHIL Coelho will need to see you again in 3-4 month. Please ensure you schedule this appointment before you leave our office today.\\nYour next appointment is scheduled for _______________________________________________.\\nIf you have any concerns please call our office at: \\[facility]annie               [phone]\\Kamron Castaneda       [phone] Post-Care Instructions: Botox Cosmetic Post Treatment Instructions\\n1. Try to exercise your treated muscles for 1-2 hours after treatment (e.g. practice frowning, raising your eyebrows or squinting). This helps to work BOTOX Cosmetic into your muscles. Although this is thought to help, it will NOT impact your treatment negatively if you forget to do this.\\n2. Do NOT rub or massage the treated areas for 2 hours after your treatment. Do NOT do strenuous exercise for 4 hours after treatment. Also avoid facials or saunas for 4 hours after your treatment. This will minimize the risk of raising your blood pressure and therefore minimize the risk of temporary bruising. Feel free to shower and go about most other regular daily activities.\\n3. Do NOT lie down for 4 hours after treatment. This is to avoid the risk of pressure on the treated areas (from your pillow) and to avoid the risk of having the area rubbed accidentally.\\n4. Be assured that any tiny bumps or marks will go away within a few hours. If you need to apply make-up within 4 hours after treatment, only use a GENTLE touch to avoid rubbing the treated area.\\n5. Results of your treatment may take up to 14 days to take full effect. Please wait until the 14 days has passed before assessing if you are pleased with the result.\\n6. PHIL Coelho needs to see you for a 2 week follow up assessment appointment. This will ensure PHIL Coelho is able to see how YOUR facial muscles reacted to your treatment. If you require more\\nproduct to fine tune/adjust your treatment results, it will be applied during this appointment at an additional cost. For medical reasons, your results will be photographed and documented in your confidential patient file.\\nYour 2 week assessment is scheduled for _______________________________________________.\\n7. Because BOTOX Cosmetic requires a special technique in order to customize the injections to your individual muscular structure, it is important that your muscle actively recovers BUT that your skin is not creasing to the point from where you started.\\n8. BOTOX Cosmetic is a temporary procedure and at first, you may find that your treatment results will last approximately 3-4 months. If you maintain your treatment appointments with the frequency recommended by PHIL Coelho, the duration of each treatment result may last longer than 4 months.\\n9. Initially PA Laquita sees her patients between the 3 months (12 week) and 4 months (16 week) time period. She is able to create the best clinical results for you during this period. If you allow BOTOX Cosmetics to completely wear off, it is difficult for PHIL Coelho to be able to see how your individual muscles reacted and therefore optimal results for YOUR face can be more difficult to achieve.\\n10. PHIL Coelho will need to see you again in 3-4 month. Please ensure you schedule this appointment before you leave our office today.\\nYour next appointment is scheduled for _______________________________________________.\\nIf you have any concerns please call our office at: \\Jeannie               [phone]\\Kamron Castaneda       [phone]

## 2023-05-02 ENCOUNTER — APPOINTMENT (RX ONLY)
Dept: URBAN - METROPOLITAN AREA CLINIC 91 | Facility: CLINIC | Age: 48
Setting detail: DERMATOLOGY
End: 2023-05-02

## 2023-05-02 DIAGNOSIS — Z41.9 ENCOUNTER FOR PROCEDURE FOR PURPOSES OTHER THAN REMEDYING HEALTH STATE, UNSPECIFIED: ICD-10-CM

## 2023-05-02 PROCEDURE — ? BOTOX (U OR CC)

## 2023-05-02 ASSESSMENT — LOCATION DETAILED DESCRIPTION DERM
LOCATION DETAILED: LEFT SUPERIOR CENTRAL MALAR CHEEK
LOCATION DETAILED: GLABELLA
LOCATION DETAILED: LEFT LATERAL EYEBROW
LOCATION DETAILED: LEFT UPPER CUTANEOUS LIP
LOCATION DETAILED: LEFT LATERAL CANTHUS
LOCATION DETAILED: GLABELLA
LOCATION DETAILED: LEFT INFERIOR FOREHEAD
LOCATION DETAILED: RIGHT UPPER CUTANEOUS LIP
LOCATION DETAILED: LEFT NASAL SIDEWALL
LOCATION DETAILED: RIGHT FOREHEAD
LOCATION DETAILED: LEFT FOREHEAD
LOCATION DETAILED: RIGHT INFERIOR TEMPLE
LOCATION DETAILED: RIGHT LATERAL EYEBROW
LOCATION DETAILED: NASAL ROOT
LOCATION DETAILED: RIGHT SUPERIOR CENTRAL MALAR CHEEK

## 2023-05-02 ASSESSMENT — LOCATION SIMPLE DESCRIPTION DERM
LOCATION SIMPLE: RIGHT CHEEK
LOCATION SIMPLE: LEFT FOREHEAD
LOCATION SIMPLE: RIGHT LIP
LOCATION SIMPLE: RIGHT TEMPLE
LOCATION SIMPLE: LEFT EYEBROW
LOCATION SIMPLE: LEFT CHEEK
LOCATION SIMPLE: RIGHT EYEBROW
LOCATION SIMPLE: LEFT EYELID
LOCATION SIMPLE: GLABELLA
LOCATION SIMPLE: GLABELLA
LOCATION SIMPLE: LEFT NOSE
LOCATION SIMPLE: NOSE
LOCATION SIMPLE: LEFT LIP
LOCATION SIMPLE: RIGHT FOREHEAD

## 2023-05-02 ASSESSMENT — LOCATION ZONE DERM
LOCATION ZONE: NOSE
LOCATION ZONE: EYELID
LOCATION ZONE: FACE
LOCATION ZONE: FACE
LOCATION ZONE: LIP

## 2023-05-02 NOTE — PROCEDURE: BOTOX (U OR CC)
Post-Care Instructions: Botox Cosmetic Post Treatment Instructions\\n1. Try to exercise your treated muscles for 1-2 hours after treatment (e.g. practice frowning, raising your eyebrows or squinting). This helps to work BOTOX Cosmetic into your muscles. Although this is thought to help, it will NOT impact your treatment negatively if you forget to do this.\\n2. Do NOT rub or massage the treated areas for 2 hours after your treatment. Do NOT do strenuous exercise for 4 hours after treatment. Also avoid facials or saunas for 4 hours after your treatment. This will minimize the risk of raising your blood pressure and therefore minimize the risk of temporary bruising. Feel free to shower and go about most other regular daily activities.\\n3. Do NOT lie down for 4 hours after treatment. This is to avoid the risk of pressure on the treated areas (from your pillow) and to avoid the risk of having the area rubbed accidentally.\\n4. Be assured that any tiny bumps or marks will go away within a few hours. If you need to apply make-up within 4 hours after treatment, only use a GENTLE touch to avoid rubbing the treated area.\\n5. Results of your treatment may take up to 14 days to take full effect. Please wait until the 14 days has passed before assessing if you are pleased with the result.\\n6. PHIL Coelho needs to see you for a 2 week follow up assessment appointment. This will ensure PHIL Coelho is able to see how YOUR facial muscles reacted to your treatment. If you require more\\nproduct to fine tune/adjust your treatment results, it will be applied during this appointment at an additional cost. For medical reasons, your results will be photographed and documented in your confidential patient file.\\nYour 2 week assessment is scheduled for _______________________________________________.\\n7. Because BOTOX Cosmetic requires a special technique in order to customize the injections to your individual muscular structure, it is important that your muscle actively recovers BUT that your skin is not creasing to the point from where you started.\\n8. BOTOX Cosmetic is a temporary procedure and at first, you may find that your treatment results will last approximately 3-4 months. If you maintain your treatment appointments with the frequency recommended by PHIL Coelho, the duration of each treatment result may last longer than 4 months.\\n9. Initially PA Laquita sees her patients between the 3 months (12 week) and 4 months (16 week) time period. She is able to create the best clinical results for you during this period. If you allow BOTOX Cosmetics to completely wear off, it is difficult for PHIL Coelho to be able to see how your individual muscles reacted and therefore optimal results for YOUR face can be more difficult to achieve.\\n10. PHIL Coelho will need to see you again in 3-4 month. Please ensure you schedule this appointment before you leave our office today.\\nYour next appointment is scheduled for _______________________________________________.\\nIf you have any concerns please call our office at: \\Jeannie               [phone]\\Kamron Castaneda       [phone] Post-Care Instructions: Botox Cosmetic Post Treatment Instructions\\n1. Try to exercise your treated muscles for 1-2 hours after treatment (e.g. practice frowning, raising your eyebrows or squinting). This helps to work BOTOX Cosmetic into your muscles. Although this is thought to help, it will NOT impact your treatment negatively if you forget to do this.\\n2. Do NOT rub or massage the treated areas for 2 hours after your treatment. Do NOT do strenuous exercise for 4 hours after treatment. Also avoid facials or saunas for 4 hours after your treatment. This will minimize the risk of raising your blood pressure and therefore minimize the risk of temporary bruising. Feel free to shower and go about most other regular daily activities.\\n3. Do NOT lie down for 4 hours after treatment. This is to avoid the risk of pressure on the treated areas (from your pillow) and to avoid the risk of having the area rubbed accidentally.\\n4. Be assured that any tiny bumps or marks will go away within a few hours. If you need to apply make-up within 4 hours after treatment, only use a GENTLE touch to avoid rubbing the treated area.\\n5. Results of your treatment may take up to 14 days to take full effect. Please wait until the 14 days has passed before assessing if you are pleased with the result.\\n6. PHIL Coelho needs to see you for a 2 week follow up assessment appointment. This will ensure PHIL Coelho is able to see how YOUR facial muscles reacted to your treatment. If you require more\\nproduct to fine tune/adjust your treatment results, it will be applied during this appointment at an additional cost. For medical reasons, your results will be photographed and documented in your confidential patient file.\\nYour 2 week assessment is scheduled for _______________________________________________.\\n7. Because BOTOX Cosmetic requires a special technique in order to customize the injections to your individual muscular structure, it is important that your muscle actively recovers BUT that your skin is not creasing to the point from where you started.\\n8. BOTOX Cosmetic is a temporary procedure and at first, you may find that your treatment results will last approximately 3-4 months. If you maintain your treatment appointments with the frequency recommended by PHIL Coelho, the duration of each treatment result may last longer than 4 months.\\n9. Initially PA Laquita sees her patients between the 3 months (12 week) and 4 months (16 week) time period. She is able to create the best clinical results for you during this period. If you allow BOTOX Cosmetics to completely wear off, it is difficult for PHIL Coelho to be able to see how your individual muscles reacted and therefore optimal results for YOUR face can be more difficult to achieve.\\n10. PHIL Coelho will need to see you again in 3-4 month. Please ensure you schedule this appointment before you leave our office today.\\nYour next appointment is scheduled for _______________________________________________.\\nIf you have any concerns please call our office at: \\Jeannie               [phone]\\Kamron Castaneda       [phone]

## 2024-02-01 ENCOUNTER — APPOINTMENT (RX ONLY)
Dept: URBAN - METROPOLITAN AREA CLINIC 90 | Facility: CLINIC | Age: 49
Setting detail: DERMATOLOGY
End: 2024-02-01

## 2024-02-01 DIAGNOSIS — Z41.9 ENCOUNTER FOR PROCEDURE FOR PURPOSES OTHER THAN REMEDYING HEALTH STATE, UNSPECIFIED: ICD-10-CM

## 2024-02-01 PROCEDURE — ? BOTOX (U OR CC)

## 2024-02-01 ASSESSMENT — LOCATION DETAILED DESCRIPTION DERM
LOCATION DETAILED: LEFT LATERAL EYEBROW
LOCATION DETAILED: RIGHT LATERAL EYEBROW
LOCATION DETAILED: RIGHT FOREHEAD
LOCATION DETAILED: LEFT FOREHEAD
LOCATION DETAILED: LEFT NASAL SIDEWALL
LOCATION DETAILED: GLABELLA
LOCATION DETAILED: RIGHT UPPER CUTANEOUS LIP
LOCATION DETAILED: LEFT SUPERIOR CENTRAL MALAR CHEEK
LOCATION DETAILED: LEFT INFERIOR FOREHEAD
LOCATION DETAILED: LEFT LATERAL CANTHUS
LOCATION DETAILED: NASAL ROOT
LOCATION DETAILED: RIGHT SUPERIOR CENTRAL MALAR CHEEK
LOCATION DETAILED: RIGHT INFERIOR TEMPLE
LOCATION DETAILED: LEFT UPPER CUTANEOUS LIP
LOCATION DETAILED: GLABELLA

## 2024-02-01 ASSESSMENT — LOCATION SIMPLE DESCRIPTION DERM
LOCATION SIMPLE: LEFT EYELID
LOCATION SIMPLE: GLABELLA
LOCATION SIMPLE: RIGHT EYEBROW
LOCATION SIMPLE: LEFT NOSE
LOCATION SIMPLE: LEFT CHEEK
LOCATION SIMPLE: LEFT FOREHEAD
LOCATION SIMPLE: LEFT EYEBROW
LOCATION SIMPLE: RIGHT FOREHEAD
LOCATION SIMPLE: LEFT LIP
LOCATION SIMPLE: RIGHT CHEEK
LOCATION SIMPLE: GLABELLA
LOCATION SIMPLE: RIGHT TEMPLE
LOCATION SIMPLE: RIGHT LIP
LOCATION SIMPLE: NOSE

## 2024-02-01 ASSESSMENT — LOCATION ZONE DERM
LOCATION ZONE: NOSE
LOCATION ZONE: LIP
LOCATION ZONE: EYELID
LOCATION ZONE: FACE
LOCATION ZONE: FACE

## 2024-02-01 NOTE — PROCEDURE: BOTOX (U OR CC)
Detail Level: Zone
Lot #: [id number]
Measure In Units Or Cc's?: units
Price Per Unit Or Per Cc In $ (Use Numbers Only, No Special Characters Or $): 14
Consent: Written consent obtained. Risks include but not limited to lid/brow ptosis, bruising, swelling, diplopia, temporary effect, incomplete chemical denervation.  (Also see signed consent.)
Post-Care Instructions: Botox Cosmetic Post Treatment Instructions\\n1. Try to exercise your treated muscles for 1-2 hours after treatment (e.g. practice frowning, raising your eyebrows or squinting). This helps to work BOTOX Cosmetic into your muscles. Although this is thought to help, it will NOT impact your treatment negatively if you forget to do this.\\n2. Do NOT rub or massage the treated areas for 2 hours after your treatment. Do NOT do strenuous exercise for 4 hours after treatment. Also avoid facials or saunas for 4 hours after your treatment. This will minimize the risk of raising your blood pressure and therefore minimize the risk of temporary bruising. Feel free to shower and go about most other regular daily activities.\\n3. Do NOT lie down for 4 hours after treatment. This is to avoid the risk of pressure on the treated areas (from your pillow) and to avoid the risk of having the area rubbed accidentally.\\n4. Be assured that any tiny bumps or marks will go away within a few hours. If you need to apply make-up within 4 hours after treatment, only use a GENTLE touch to avoid rubbing the treated area.\\n5. Results of your treatment may take up to 14 days to take full effect. Please wait until the 14 days has passed before assessing if you are pleased with the result.\\n6. PHIL Coelho needs to see you for a 2 week follow up assessment appointment. This will ensure PHIL Coelho is able to see how YOUR facial muscles reacted to your treatment. If you require more\\nproduct to fine tune/adjust your treatment results, it will be applied during this appointment at an additional cost. For medical reasons, your results will be photographed and documented in your confidential patient file.\\nYour 2 week assessment is scheduled for _______________________________________________.\\n7. Because BOTOX Cosmetic requires a special technique in order to customize the injections to your individual muscular structure, it is important that your muscle actively recovers BUT that your skin is not creasing to the point from where you started.\\n8. BOTOX Cosmetic is a temporary procedure and at first, you may find that your treatment results will last approximately 3-4 months. If you maintain your treatment appointments with the frequency recommended by PHIL Coelho, the duration of each treatment result may last longer than 4 months.\\n9. Initially PA Laquita sees her patients between the 3 months (12 week) and 4 months (16 week) time period. She is able to create the best clinical results for you during this period. If you allow BOTOX Cosmetics to completely wear off, it is difficult for PHIL Coelho to be able to see how your individual muscles reacted and therefore optimal results for YOUR face can be more difficult to achieve.\\n10. PHIL Coelho will need to see you again in 3-4 month. Please ensure you schedule this appointment before you leave our office today.\\nYour next appointment is scheduled for _______________________________________________.\\nIf you have any concerns please call our office at: \\Jeannie               [phone]\\Kamron Castaneda       [phone]
Dilution (U/0.1 Cc): 1.1
Expiration Date (Month Year): 4/25
Quantity Per Injection Site (Units Or Cc): 1
Quantity Per Injection Site (Units Or Cc): 2
Quantity Per Injection Site (Units Or Cc): 3
Quantity Per Injection Site (Units Or Cc): 12